# Patient Record
Sex: MALE | URBAN - METROPOLITAN AREA
[De-identification: names, ages, dates, MRNs, and addresses within clinical notes are randomized per-mention and may not be internally consistent; named-entity substitution may affect disease eponyms.]

---

## 2024-03-26 ENCOUNTER — LAB (OUTPATIENT)
Dept: URBAN - METROPOLITAN AREA MEDICAL CENTER 25 | Facility: MEDICAL CENTER | Age: 64
End: 2024-03-26

## 2024-03-26 PROCEDURE — 99254 IP/OBS CNSLTJ NEW/EST MOD 60: CPT | Performed by: INTERNAL MEDICINE

## 2024-03-27 ENCOUNTER — LAB (OUTPATIENT)
Dept: URBAN - METROPOLITAN AREA MEDICAL CENTER 25 | Facility: MEDICAL CENTER | Age: 64
End: 2024-03-27

## 2024-03-27 PROCEDURE — 99232 SBSQ HOSP IP/OBS MODERATE 35: CPT | Performed by: INTERNAL MEDICINE

## 2024-03-28 ENCOUNTER — LAB (OUTPATIENT)
Dept: URBAN - METROPOLITAN AREA MEDICAL CENTER 25 | Facility: MEDICAL CENTER | Age: 64
End: 2024-03-28

## 2024-03-28 PROCEDURE — 43235 EGD DIAGNOSTIC BRUSH WASH: CPT | Performed by: INTERNAL MEDICINE

## 2024-03-29 ENCOUNTER — LAB (OUTPATIENT)
Dept: URBAN - METROPOLITAN AREA MEDICAL CENTER 25 | Facility: MEDICAL CENTER | Age: 64
End: 2024-03-29

## 2024-03-29 PROCEDURE — 99232 SBSQ HOSP IP/OBS MODERATE 35: CPT | Performed by: INTERNAL MEDICINE

## 2024-04-15 ENCOUNTER — OV NP (OUTPATIENT)
Dept: URBAN - METROPOLITAN AREA CLINIC 19 | Facility: CLINIC | Age: 64
End: 2024-04-15
Payer: SELF-PAY

## 2024-04-15 ENCOUNTER — LAB (OUTPATIENT)
Dept: URBAN - METROPOLITAN AREA CLINIC 19 | Facility: CLINIC | Age: 64
End: 2024-04-15

## 2024-04-15 VITALS
BODY MASS INDEX: 24.88 KG/M2 | HEIGHT: 69 IN | WEIGHT: 168 LBS | TEMPERATURE: 97.8 F | DIASTOLIC BLOOD PRESSURE: 80 MMHG | SYSTOLIC BLOOD PRESSURE: 140 MMHG | HEART RATE: 83 BPM | OXYGEN SATURATION: 98 %

## 2024-04-15 DIAGNOSIS — Z86.010 PERSONAL HISTORY OF COLON POLYPS: ICD-10-CM

## 2024-04-15 DIAGNOSIS — K57.90 DIVERTICULOSIS: ICD-10-CM

## 2024-04-15 DIAGNOSIS — K76.6 GASTROPATHY, PORTAL HYPERTENSIVE: ICD-10-CM

## 2024-04-15 DIAGNOSIS — K76.0 HEPATIC STEATOSIS: ICD-10-CM

## 2024-04-15 PROCEDURE — 99202 OFFICE O/P NEW SF 15 MIN: CPT | Performed by: INTERNAL MEDICINE

## 2024-04-15 RX ORDER — GABAPENTIN 300 MG/1
1 CAPSULE CAPSULE ORAL ONCE A DAY
Status: ACTIVE | COMMUNITY

## 2024-04-15 RX ORDER — PANTOPRAZOLE SODIUM 40 MG/1
1 TABLET TABLET, DELAYED RELEASE ORAL ONCE A DAY
Status: ACTIVE | COMMUNITY

## 2024-04-15 RX ORDER — COLCHICINE 0.6 MG/1
1 TABLET TABLET, FILM COATED ORAL
Status: ACTIVE | COMMUNITY

## 2024-04-15 RX ORDER — NICOTINE 21 MG/24HR
1 PATCH TO SKIN PATCH, TRANSDERMAL 24 HOURS TRANSDERMAL ONCE A DAY
Status: ACTIVE | COMMUNITY

## 2024-04-15 RX ORDER — VENLAFAXINE HYDROCHLORIDE 100 MG/1
1 TABLET WITH FOOD TABLET ORAL ONCE A DAY
Status: ACTIVE | COMMUNITY

## 2024-04-15 RX ORDER — PHENOBARBITAL 16.2 MG/1
1 TABLET TABLET ORAL TWICE A DAY
Status: ACTIVE | COMMUNITY

## 2024-04-15 RX ORDER — TRAZODONE HYDROCHLORIDE 50 MG/1
1 TABLET AT BEDTIME AS NEEDED TABLET ORAL ONCE A DAY
Status: ACTIVE | COMMUNITY

## 2024-04-15 NOTE — HPI-TODAY'S VISIT:
Mr. Daniels is a 63-year-old male with PMH of alcohol use, HTN, chronic thrombocytopenia, PE 2019 no longer on AC, and tobacco use who presented to [facility] ER on 3/26/2024 with complaints of persistent nausea and vomiting over the last 4 days.  He reported dark emesis and stools.  He admitted to drinking 2 to 4 pints of vodka daily as well as NSAIDs use. He was discharged on 3/31/2024 after being given PRBC and extensive counseling for alcohol/drug abstinence.  Records reviewed: 3/27/2024 CT A/P without contrast: Severe hepatic steatosis with new mild ascites.  Colonic diverticulosis without acute diverticulitis. 3/28/2024 EGD by Dr. Watson: Normal esophagus, portal hypertensive gastropathy, gastritis, normal duodenum.  Recommended Protonix 40 mg twice daily.  Biopsy benign gastric antral mucosa demonstrating mild chronic inflammation.  No H. pylori.   Has been sober for 7 days (was drinking 2 bottles of vodka per day). States he is feeling well. Cooking meals at home. Reports having "light" shakiness a few days ago. Is considering going to AA meetings. Last colonoscopy was done 10 years ago - says it showed 3 polyps. Denies rectal bleeding, family history of colon cancer, weight or appetite changes. Reports daily BMs Sebastian 4. Denies nausea, vomiting or abdominal pain. [facility] is currently [facility].